# Patient Record
(demographics unavailable — no encounter records)

---

## 2025-01-16 NOTE — HISTORY OF PRESENT ILLNESS
[de-identified] : Ms. CARLISLE is a pleasant 46 year old female who presents today with a chief complaint of RIGHT ear pulsatile tinnitus.  It first started in summer 2024 along with severe vertigo.  The vertigo only lasted 2 days but the pulsatile tinnitus persists.  It is described as a constant, whooshing sound that is in sync with her pulse.  Pressing on the right side of her neck improves the sound.  She has been seen by multiple ENTs in the past and had a normal hearing test as per patient.    She denies any headaches, blurry vision or diplopia.  She had a normal eye exam in 7/2024.

## 2025-01-16 NOTE — ASSESSMENT
[FreeTextEntry1] : Impression: RIGHT Pulsatile Tinnitus Improves with ipsilateral neck pressure No Headaches or Vision Complaints  We discussed possible causes of pulsatile tinnitus including: ENT causes such as semicircular canal dehiscence, tumor, ototoxicity Cardiac causes such as aortic stenosis, valve disease, HTN, other causes of heart murmur Anemia Thyroid disease Cerebrovascular causes such as arteriovenous malformation (AVM), dural arteriovenous fistula (dAVF), venous sinus stenosis, venous aneurysm, large trans-mastoid emissary vein, carotid stenosis, jugular bulb diverticulum   MRA Head and Neck 11/2024 reveals no dural AVF, AVM, carotid stenosis or dissection; co-dominant system with moderate right transverse venous sinus stenosis; left sigmoid sinus diverticulum; bilateral prominent condylar veins MR Head 11/2024 reveals no tumor, stroke or hemorrhage CT Temporal Bone 10/2024 reveals no semicircular canal dehiscence or sigmoid sinus wall dehiscence  [facility] CARLISLE likely suffers from pulsatile tinnitus from venous origin.  Both venous sinus stenosis and prominent condylar veins can cause pulsatile tinnitus.    The pulsatile tinnitus is severe and has a negative impact in social life, work and daily living. We discussed treatment options which include observation, trial of Diamox, endovascular treatment, open surgery.  The patient would like to proceed with endovascular treatment. 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 We discussed with the patient my extensive personal experience with this treatment and the results of a recent clinical trial (Dolores et al, Interventional Neuroradiology 2020). We discussed the procedure that has two components. The first part consists of catheter angiogram and manometry to assess the degree of stenosis and balloon test occlusion to confirm the source of the sound. This part is typically performed with the patient awake. The second part consists of treating the cause of the pulsatile tinnitus utilizing stent, coils or both and is performed under general anesthesia.  The risks, benefits and alternatives of the procedure were discussed with the patient in detail. In my personal experience, the risks are very rare, but the possibility is not zero. Risks include stroke, brain hemorrhage, any type of disability (facial or extremity weakness, facial or extremity numbness, speech difficulties, blindness) and death. There are also possible complications related to the incisions such as infection, pain, swelling and bleeding.  The patient is aware that the procedure requires dual antiplatelet therapy for 1-month post-stent (typically aspirin 325 mg daily and clopidogrel 75 mg daily) and antiplatelet monotherapy (typically aspirin 325 mg daily) for additional 11 months post-stent.  Plan: Schedule Catheter Angiogram/Venogram/Balloon Test Occlusion/Possible Venous Sinus Stenting